# Patient Record
Sex: MALE | Race: WHITE
[De-identification: names, ages, dates, MRNs, and addresses within clinical notes are randomized per-mention and may not be internally consistent; named-entity substitution may affect disease eponyms.]

---

## 2019-06-16 ENCOUNTER — HOSPITAL ENCOUNTER (EMERGENCY)
Dept: HOSPITAL 60 - LB.ED | Age: 59
Discharge: HOME | End: 2019-06-16
Payer: COMMERCIAL

## 2019-06-16 VITALS — SYSTOLIC BLOOD PRESSURE: 150 MMHG | DIASTOLIC BLOOD PRESSURE: 85 MMHG
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
## 2019-06-16 DIAGNOSIS — Z79.899: ICD-10-CM

## 2019-06-16 DIAGNOSIS — S91.331A: Primary | ICD-10-CM

## 2019-06-16 PROCEDURE — 99282 EMERGENCY DEPT VISIT SF MDM: CPT

## 2019-06-16 NOTE — EDM.PDOC
ED HPI GENERAL MEDICAL PROBLEM





- General


Stated Complaint: puncture wound to foot


Time Seen by Provider: 06/16/19 09:15


Source of Information: Reports: Patient


History Limitations: Reports: No Limitations





- History of Present Illness


INITIAL COMMENTS - FREE TEXT/NARRATIVE: 





According to patient he stepped on bebo nail in the yard yesterday and 

sustained a puncture wound over the right foot. Today morning when he woke up 

he has noticed some redness and swelling around the puncture site. No 

discharge. No swelling of the foot. Able to walk on his foot with out 

discomfort. No fever or chills.





Pt's last tetanus was in 2012. No other complaints.


Onset Date: 06/15/19


Location: Reports: Lower Extremity, Right


Quality: Reports: Ache


Severity: Mild


Improves with: Reports: None


Worsens with: Reports: None


Associated Symptoms: Denies: Confusion, Chest Pain, Fever/Chills, Nausea/

Vomiting, Rash, Seizure, Shortness of Breath





- Related Data


 Allergies











Allergy/AdvReac Type Severity Reaction Status Date / Time


 


No Known Allergies Allergy   Verified 10/01/15 11:25











Home Meds: 


 Home Meds





atorvaSTATin [Lipitor]  10/01/15 [History]











ED ROS GENERAL





- Review of Systems


Review Of Systems: See Below


Constitutional: Denies: Fever, Chills, Weakness


HEENT: Denies: Rhinitis, Throat Pain


Respiratory: Denies: Cough, Sputum


Cardiovascular: Denies: Chest Pain, Lightheadedness, Syncope


GI/Abdominal: Denies: Abdominal Pain, Nausea, Vomiting


Skin: Reports: Wound.  Denies: Bruising, Pruritis, Rash


Neurological: Denies: Confusion, Dizziness, Headache, Numbness, Tingling, 

Weakness





ED EXAM, GENERAL





- Physical Exam


Exam: See Below


Exam Limited By: No Limitations


General Appearance: Alert, WD/WN, No Apparent Distress


Eye Exam: Bilateral Eye: EOMI, PERRL


Ears: Normal External Exam, Normal Canal, Hearing Grossly Normal, Normal TMs


Ear Exam: Bilateral Ear: Auricle Normal, Canal Normal, TM normal


Nose: Normal Inspection, Normal Mucosa, No Blood


Throat/Mouth: Normal Inspection, Normal Lips, Normal Teeth, Normal Gums, Normal 

Oropharynx, Normal Voice, No Airway Compromise


Head: Atraumatic, Normocephalic


Neck: Normal Inspection, Supple, Non-Tender, Full Range of Motion


Respiratory/Chest: No Respiratory Distress, Lungs Clear, Normal Breath Sounds, 

No Accessory Muscle Use, Chest Non-Tender


Cardiovascular: Normal Peripheral Pulses, Regular Rate, Rhythm, No Edema, No 

Gallop, No JVD, No Murmur, No Rub


Extremities: Normal Range of Motion, No Pedal Edema, Normal Capillary Refill, 

Other (Right foot: there is an area of erythema over the lateral aspect of the 

foot. Approximately 1cm by 2cm. There is a small puncutre of the skin noted. 

Minimal tenderness over the werythema. No bony tenderness over the metatarsals. 

Normal gait.)





Course





- Vital Signs


Text/Narrative:: 


Pt has a mild infection around the puncture wound. it appear like superficial 

infection. he is upto date on tetanus. He claims the nail was bebo, hence he 

has been empirically covered with Augmentin 875mg BID for next 10 days. Advised 

simple bacitracin dressing. If symptoms worsen followup with his primary care 

provider.





Last Recorded V/S: 


 Last Vital Signs











Temp  98.2 F   06/16/19 09:15


 


Pulse  57 L  06/16/19 09:15


 


Resp  14   06/16/19 09:15


 


BP  150/85 H  06/16/19 09:15


 


Pulse Ox  99   06/16/19 09:15














Departure





- Departure


Time of Disposition: 09:45


Disposition: Home, Self-Care 01


Clinical Impression: 


 Puncture wound of foot








- Discharge Information


*PRESCRIPTION DRUG MONITORING PROGRAM REVIEWED*: Not Applicable


*COPY OF PRESCRIPTION DRUG MONITORING REPORT IN PATIENT MANASA: Not Applicable


Instructions:  Wound Infection


Referrals: 


PCP,None [Primary Care Provider] - 


Care Plan Goals: 


Take antibiotic as prescribed. keep wound clean and dry. Return if symptoms 

worsen.





- Problem List & Annotations


(1) Puncture wound of foot


SNOMED Code(s): 93743148


   Code(s): S91.339A - PUNCTURE WOUND WITHOUT FOREIGN BODY, UNSP FOOT, INIT 

ENCNTR   Status: Acute   





- Problem List Review


Problem List Initiated/Reviewed/Updated: Yes





- Assessment/Plan


Assessment:: 


puncture wound right foot





Plan: 


Pt has a mild infection around the puncture wound. it appear like superficial 

infection. he is upto date on tetanus. He claims the nail was bebo, hence he 

has been empirically covered with Augmentin 875mg BID for next 10 days. Advised 

simple bacitracin dressing. If symptoms worsen followup with his primary care 

provider.